# Patient Record
Sex: FEMALE | Race: WHITE | NOT HISPANIC OR LATINO | Employment: UNEMPLOYED | URBAN - NONMETROPOLITAN AREA
[De-identification: names, ages, dates, MRNs, and addresses within clinical notes are randomized per-mention and may not be internally consistent; named-entity substitution may affect disease eponyms.]

---

## 2023-03-15 ENCOUNTER — OFFICE VISIT (OUTPATIENT)
Dept: PODIATRY | Age: 58
End: 2023-03-15

## 2023-03-15 VITALS
SYSTOLIC BLOOD PRESSURE: 110 MMHG | HEIGHT: 60 IN | BODY MASS INDEX: 17.28 KG/M2 | WEIGHT: 88 LBS | DIASTOLIC BLOOD PRESSURE: 80 MMHG

## 2023-03-15 DIAGNOSIS — L60.3 ONYCHODYSTROPHY: Primary | ICD-10-CM

## 2023-03-15 DIAGNOSIS — M79.674 TOE PAIN, BILATERAL: ICD-10-CM

## 2023-03-15 DIAGNOSIS — M79.675 TOE PAIN, BILATERAL: ICD-10-CM

## 2023-03-15 NOTE — PROGRESS NOTES
Assessment/Plan:     Diagnoses and all orders for this visit:    Onychodystrophy    Toe pain, bilateral           IMPRESSION:  · Left hallux onychodystrophy (likely onychomycosis) with pain, lateral border     PLAN:  · KOH negative  C/w nail trimming  · Used tissue nippers to remove thickened skin to nail border and piece of nail pressure on skin  Pain improved  · Rec Aquaphor to keep skin edge soft  · F/u PRN    Subjective:      Patient ID: Elena Worthington is a 61 y o  female  Lawayne Romberg presents to clinic today concerning Left hallux onychodystrophy (likely onychomycosis) with pain, lateral border  Notes pain in tight shoes still  Toe Pain   Pertinent negatives include no numbness  The following portions of the patient's history were reviewed and updated as appropriate: allergies, current medications, past family history, past medical history, past social history, past surgical history and problem list     Review of Systems   Constitutional: Negative for activity change, chills and fever  HENT: Negative  Respiratory: Negative for cough, chest tightness and shortness of breath  Cardiovascular: Negative for chest pain and leg swelling  Endocrine: Negative  Genitourinary: Negative  Skin:        B/L toenail dystrophy   Neurological: Negative  Negative for numbness  Psychiatric/Behavioral: Negative  Negative for agitation and behavioral problems  Objective:      /80 (BP Location: Left arm, Patient Position: Sitting, Cuff Size: Standard)   Ht 5' (1 524 m)   Wt 39 9 kg (88 lb)   BMI 17 19 kg/m²          Physical Exam  Constitutional:       General: She is not in acute distress  Appearance: Normal appearance  She is not ill-appearing  Cardiovascular:      Pulses: Normal pulses  Comments: Bilateral DP & PT pulses 2/4  CRT WNL  Pedal hair present  Feet warm and well perfused  Pulmonary:      Effort: No respiratory distress     Musculoskeletal:      Comments: Bilateral ankle, STJ, TNJ, TMTJ, MTPJ ROM WNL and without pain  LE muscle strength WNL  Skin:     General: Skin is warm  Capillary Refill: Capillary refill takes less than 2 seconds  Findings: No erythema or lesion  Comments: B/L hallux toenail distal thickening, discoloration, elongation, subungual debris  There is tenderness to only left lateral 1st nail border on palpation  Neurological:      General: No focal deficit present  Mental Status: She is alert and oriented to person, place, and time  Sensory: No sensory deficit  Comments: Gross sensation intact  Denies N/T/B to B/L feet      Psychiatric:         Mood and Affect: Mood normal          Behavior: Behavior normal

## 2023-03-15 NOTE — PATIENT INSTRUCTIONS
Aquaphor       Wear supportive shoes at all times  Avoid flip-flops, flat sandals, barefoot walking (never walk barefoot, even at home)  Generally avoid shoes that are too flexible and bend in the arch  Your shoes should only slightly bend in the toe area, not the middle  Running sneakers are often the best choice  Wide and soft in toe box  Supportive sneaker brands: Enedina Bangura, [address] [place] [address] [employer] [place], [employer], [employer]  Supportive daily shoe brands: Vionic, Orthofeet Beth, [employer]ko, [employer], Rere [place], [employer]  Supportive home shoes:  Navarro Ards (recovery slides)  Look in to over the counter shoe inserts/arch supports such as Ehsan Coup

## 2025-04-24 ENCOUNTER — OFFICE VISIT (OUTPATIENT)
Dept: PODIATRY | Age: 60
End: 2025-04-24
Payer: COMMERCIAL

## 2025-04-24 VITALS — WEIGHT: 98.8 LBS | BODY MASS INDEX: 19.39 KG/M2 | HEIGHT: 60 IN

## 2025-04-24 DIAGNOSIS — M79.675 PAIN OF LEFT GREAT TOE: ICD-10-CM

## 2025-04-24 DIAGNOSIS — B35.1 ONYCHOMYCOSIS: Primary | ICD-10-CM

## 2025-04-24 DIAGNOSIS — L60.0 IGTN (INGROWING TOE NAIL): ICD-10-CM

## 2025-04-24 PROCEDURE — 99213 OFFICE O/P EST LOW 20 MIN: CPT | Performed by: STUDENT IN AN ORGANIZED HEALTH CARE EDUCATION/TRAINING PROGRAM

## 2025-04-24 PROCEDURE — 11720 DEBRIDE NAIL 1-5: CPT | Performed by: STUDENT IN AN ORGANIZED HEALTH CARE EDUCATION/TRAINING PROGRAM

## 2025-04-24 RX ORDER — TRAMADOL HYDROCHLORIDE 50 MG/1
50 TABLET ORAL EVERY 6 HOURS PRN
COMMUNITY
Start: 2025-03-27

## 2025-04-24 RX ORDER — FLUOXETINE 10 MG/1
1 CAPSULE ORAL DAILY
COMMUNITY
Start: 2025-02-13

## 2025-04-24 RX ORDER — CICLOPIROX 80 MG/ML
SOLUTION TOPICAL
Qty: 6.6 ML | Refills: 3 | Status: SHIPPED | OUTPATIENT
Start: 2025-04-24

## 2025-04-24 RX ORDER — LORAZEPAM 0.5 MG/1
0.5 TABLET ORAL DAILY PRN
COMMUNITY
Start: 2025-01-16

## 2025-04-24 RX ORDER — VARENICLINE TARTRATE 1 MG/1
1 TABLET, FILM COATED ORAL 2 TIMES DAILY
COMMUNITY
Start: 2025-04-11

## 2025-04-24 RX ORDER — OLANZAPINE 10 MG/1
10 TABLET, FILM COATED ORAL
COMMUNITY

## 2025-04-24 NOTE — PROGRESS NOTES
Name: Keisha Raines      : 1965      MRN: [id number]  Encounter Provider: Paulina Hadley DPM  Encounter Date: 2025   Encounter department: [facility] [place]  :  Assessment & Plan  Onychomycosis    Orders:    ciclopirox (PENLAC) 8 % solution; Apply topically daily at bedtime File nails in thicknes weekly. Use for 6-12months    Pain of left great toe         IGTN (ingrowing toe nail)           PLAN:  Left 1st toenail medial IGTN. Toenail debrided in length and thickness with nail nipper and [place], slanted back medial border. Pain improved, superficial wound noted, no SOI. Aquaphor and bandaid daily. Call if not healed in 1wk. Call if SOI arise. PNA perm prn.   I discussed treatment options for toenail fungus (B/L 1st) with patient in great detail today. Patient has decided on topical penlac daily application (with weekly filing in thickness). We discussed that nails take anywhere from 6-12 months to fully grow out and thus she should continue to see improvement in distal nails as time progresses.   I discussed that even with sufficient topical treatment, cure rate is <15% and there is high chance of recurrent toenail fungal infection even if cure is achieved.   I discussed general foot hygiene such as keep feet clean and dry, wearing clean socks, washing socks in hot water + laundry , cleaning showers regularly, avoiding keeping shoes in dark/wark spots, using antifungal shoe spray, alternating shoes or replacing shoes all together  F/u in 12 months     History of Present Illness   HPI  Keisha Raines is a 60 y.o. female who presents to clinic for left 1st toenail pain. Has not trimmed the toenail in 2 years per patient. Notes pain and thickness. Last seen 3/15/23 for nail fungus.       Review of Systems   Constitutional:  Negative for activity change, chills and fever.   HENT: Negative.     Respiratory:  Negative for cough, chest tightness and shortness of breath.     Cardiovascular:  Negative for chest pain and leg swelling.   Endocrine: Negative.    Genitourinary: Negative.    Neurological: Negative.  Negative for numbness.   Psychiatric/Behavioral: Negative.  Negative for agitation and behavioral problems.           Objective   Ht 5' (1.524 m)   Wt 44.8 kg (98 lb 12.8 oz)   BMI 19.30 kg/m²      Physical Exam  Vitals and nursing note reviewed.   Constitutional:       General: She is not in acute distress.     Appearance: She is well-developed.   Cardiovascular:      Pulses: Normal pulses.   Pulmonary:      Effort: Pulmonary effort is normal. No respiratory distress.   Musculoskeletal:         General: No swelling.   Skin:     General: Skin is warm and dry.      Capillary Refill: Capillary refill takes less than 2 seconds.      Comments: B/L 1st toenail elongated, thickened, yellowed with subungual debris. Left 1st distal medial border ingrowth with superficial wound, no acute SOI. + tenderness.    Neurological:      Mental Status: She is alert.   Psychiatric:         Mood and Affect: Mood normal.